# Patient Record
Sex: MALE | Race: WHITE | NOT HISPANIC OR LATINO | URBAN - METROPOLITAN AREA
[De-identification: names, ages, dates, MRNs, and addresses within clinical notes are randomized per-mention and may not be internally consistent; named-entity substitution may affect disease eponyms.]

---

## 2017-02-23 ENCOUNTER — EMERGENCY (EMERGENCY)
Facility: HOSPITAL | Age: 27
LOS: 1 days | Discharge: HOME | End: 2017-02-23
Attending: EMERGENCY MEDICINE | Admitting: EMERGENCY MEDICINE
Payer: COMMERCIAL

## 2017-02-23 VITALS
HEART RATE: 72 BPM | DIASTOLIC BLOOD PRESSURE: 70 MMHG | WEIGHT: 154.98 LBS | SYSTOLIC BLOOD PRESSURE: 110 MMHG | TEMPERATURE: 98 F | RESPIRATION RATE: 18 BRPM | OXYGEN SATURATION: 98 %

## 2017-02-23 DIAGNOSIS — R07.89 OTHER CHEST PAIN: ICD-10-CM

## 2017-02-23 LAB — D DIMER BLD IA.RAPID-MCNC: <150 NG/ML DDU — SIGNIFICANT CHANGE UP

## 2017-02-23 PROCEDURE — 93005 ELECTROCARDIOGRAM TRACING: CPT

## 2017-02-23 PROCEDURE — 99285 EMERGENCY DEPT VISIT HI MDM: CPT | Mod: 25

## 2017-02-23 PROCEDURE — 93010 ELECTROCARDIOGRAM REPORT: CPT

## 2017-02-23 PROCEDURE — 99283 EMERGENCY DEPT VISIT LOW MDM: CPT | Mod: 25

## 2017-02-23 PROCEDURE — 36415 COLL VENOUS BLD VENIPUNCTURE: CPT

## 2017-02-23 PROCEDURE — 85379 FIBRIN DEGRADATION QUANT: CPT

## 2017-02-23 NOTE — ED PROVIDER NOTE - MEDICAL DECISION MAKING DETAILS
nonradiating cp, constant, sp flight, also reported R calf pain, no obvious swelling noted or palpable cord, will d-dimer, (fhx of pe), screening ekg, low suspicion for ACS,

## 2017-02-23 NOTE — ED PROVIDER NOTE - DIAGNOSTIC INTERPRETATION
outside xray reviewed by me  ER Physician: Tima Levi  CHEST XRAY INTERPRETATION: lungs clear, heart shadow normal, bony structures intact

## 2017-02-23 NOTE — ED ADULT TRIAGE NOTE - CHIEF COMPLAINT QUOTE
chest pain for 8 days , pain to rt arm / rt leg today .pt was seen at [facility] and sent for ekg and evaluation.

## 2017-02-23 NOTE — ED PROVIDER NOTE - PHYSICAL EXAMINATION
CON: ao x 3, HENMT: clear oropharynx, soft neck, HEAD: atraumatic, CV: rrr, equal pulses b/l, RESP: cta b/l, GI: +BS, soft, nontender, no rebound, no guarding, SKIN: no rash, MSK: nontender, moving all extremities spontaneously, NEURO: nonfocal

## 2017-02-23 NOTE — ED ADULT NURSE NOTE - OBJECTIVE STATEMENT
[place] with complaint of R chest pain closer to axillary area beginning 4 days ago after return from [facility], pain is described as sharp and constant with radiation to R back and R upper arm, patient also  complains of R calf pain.

## 2017-02-23 NOTE — ED ADULT NURSE NOTE - CHIEF COMPLAINT QUOTE
chest pain for 8 days , pain to rt arm / rt leg today .pt was seen at [facility] and sent for ekg and evaluation.

## 2017-02-23 NOTE — ED ADULT NURSE NOTE - FAMILY HISTORY
Grandparent  Still living? No  Family history of cerebrovascular accident (CVA), Age at diagnosis: Age Unknown     Uncle  Still living? No  Family history of pulmonary embolism, Age at diagnosis: Age Unknown

## 2017-02-23 NOTE — ED PROVIDER NOTE - OBJECTIVE STATEMENT
26 yom pw right sided chest pain, localized, x 8 days, constant, nonradiating, no modifying or eliciting factors.  started initially after a flight (2hr w/ layover followed by another 2 hr).  states when he breathes deeply feels tighter.  today noticed to have pain to R bicep region, again, localized and nonradiating which prompted his visit.  pt also noted to have RLL calf pain for 1 day1.  nonexertional.  no other sx.  reported fhx of PE in one of the relatives.  nonsmokers.  no hx of CAD.

## 2018-07-09 ENCOUNTER — EMERGENCY (EMERGENCY)
Facility: HOSPITAL | Age: 28
LOS: 1 days | Discharge: HOME | End: 2018-07-09
Attending: EMERGENCY MEDICINE | Admitting: EMERGENCY MEDICINE
Payer: COMMERCIAL

## 2018-07-09 VITALS
HEART RATE: 71 BPM | RESPIRATION RATE: 18 BRPM | WEIGHT: 154.98 LBS | OXYGEN SATURATION: 98 % | TEMPERATURE: 98 F | DIASTOLIC BLOOD PRESSURE: 75 MMHG | SYSTOLIC BLOOD PRESSURE: 144 MMHG

## 2018-07-09 VITALS
OXYGEN SATURATION: 99 % | HEART RATE: 76 BPM | TEMPERATURE: 98 F | SYSTOLIC BLOOD PRESSURE: 142 MMHG | DIASTOLIC BLOOD PRESSURE: 76 MMHG | RESPIRATION RATE: 18 BRPM

## 2018-07-09 DIAGNOSIS — M79.662 PAIN IN LEFT LOWER LEG: ICD-10-CM

## 2018-07-09 DIAGNOSIS — M79.661 PAIN IN RIGHT LOWER LEG: ICD-10-CM

## 2018-07-09 DIAGNOSIS — R25.2 CRAMP AND SPASM: ICD-10-CM

## 2018-07-09 LAB
ALBUMIN SERPL ELPH-MCNC: 4.7 G/DL — SIGNIFICANT CHANGE UP (ref 3.3–5)
ALP SERPL-CCNC: 59 U/L — SIGNIFICANT CHANGE UP (ref 40–120)
ALT FLD-CCNC: 15 U/L — SIGNIFICANT CHANGE UP (ref 10–45)
ANION GAP SERPL CALC-SCNC: 13 MMOL/L — SIGNIFICANT CHANGE UP (ref 5–17)
APTT BLD: 31.3 SEC — SIGNIFICANT CHANGE UP (ref 27.5–37.4)
AST SERPL-CCNC: 24 U/L — SIGNIFICANT CHANGE UP (ref 10–40)
BILIRUB SERPL-MCNC: 1.1 MG/DL — SIGNIFICANT CHANGE UP (ref 0.2–1.2)
BUN SERPL-MCNC: 15 MG/DL — SIGNIFICANT CHANGE UP (ref 7–23)
CALCIUM SERPL-MCNC: 9.7 MG/DL — SIGNIFICANT CHANGE UP (ref 8.4–10.5)
CHLORIDE SERPL-SCNC: 102 MMOL/L — SIGNIFICANT CHANGE UP (ref 96–108)
CO2 SERPL-SCNC: 28 MMOL/L — SIGNIFICANT CHANGE UP (ref 22–31)
CREAT SERPL-MCNC: 0.97 MG/DL — SIGNIFICANT CHANGE UP (ref 0.5–1.3)
GLUCOSE SERPL-MCNC: 117 MG/DL — HIGH (ref 70–99)
HCT VFR BLD CALC: 43.4 % — SIGNIFICANT CHANGE UP (ref 39–50)
HGB BLD-MCNC: 14.5 G/DL — SIGNIFICANT CHANGE UP (ref 13–17)
INR BLD: 1.08 — SIGNIFICANT CHANGE UP (ref 0.88–1.16)
MCHC RBC-ENTMCNC: 28.7 PG — SIGNIFICANT CHANGE UP (ref 27–34)
MCHC RBC-ENTMCNC: 33.4 G/DL — SIGNIFICANT CHANGE UP (ref 32–36)
MCV RBC AUTO: 85.8 FL — SIGNIFICANT CHANGE UP (ref 80–100)
PLATELET # BLD AUTO: 177 K/UL — SIGNIFICANT CHANGE UP (ref 150–400)
POTASSIUM SERPL-MCNC: 4 MMOL/L — SIGNIFICANT CHANGE UP (ref 3.5–5.3)
POTASSIUM SERPL-SCNC: 4 MMOL/L — SIGNIFICANT CHANGE UP (ref 3.5–5.3)
PROT SERPL-MCNC: 6.6 G/DL — SIGNIFICANT CHANGE UP (ref 6–8.3)
PROTHROM AB SERPL-ACNC: 12 SEC — SIGNIFICANT CHANGE UP (ref 9.8–12.7)
RBC # BLD: 5.06 M/UL — SIGNIFICANT CHANGE UP (ref 4.2–5.8)
RBC # FLD: 12 % — SIGNIFICANT CHANGE UP (ref 10.3–16.9)
SODIUM SERPL-SCNC: 143 MMOL/L — SIGNIFICANT CHANGE UP (ref 135–145)
WBC # BLD: 5.6 K/UL — SIGNIFICANT CHANGE UP (ref 3.8–10.5)
WBC # FLD AUTO: 5.6 K/UL — SIGNIFICANT CHANGE UP (ref 3.8–10.5)

## 2018-07-09 PROCEDURE — 36415 COLL VENOUS BLD VENIPUNCTURE: CPT

## 2018-07-09 PROCEDURE — 93970 EXTREMITY STUDY: CPT

## 2018-07-09 PROCEDURE — 93970 EXTREMITY STUDY: CPT | Mod: 26

## 2018-07-09 PROCEDURE — 99284 EMERGENCY DEPT VISIT MOD MDM: CPT | Mod: 25

## 2018-07-09 PROCEDURE — 85730 THROMBOPLASTIN TIME PARTIAL: CPT

## 2018-07-09 PROCEDURE — 80053 COMPREHEN METABOLIC PANEL: CPT

## 2018-07-09 PROCEDURE — 85027 COMPLETE CBC AUTOMATED: CPT

## 2018-07-09 PROCEDURE — 85610 PROTHROMBIN TIME: CPT

## 2018-07-09 NOTE — ED ADULT NURSE NOTE - OBJECTIVE STATEMENT
CC of bilat leg pain R > L after travel from [place], better today than yesterday, never happened before  Family Hx (father) clot on the leg

## 2018-07-09 NOTE — ED ADULT TRIAGE NOTE - ARRIVAL INFO ADDITIONAL COMMENTS
Pt c/o Bilat calf pain and foot swelling. Pt states his pain started after flying from [place] to [place].

## 2018-07-09 NOTE — ED PROVIDER NOTE - OBJECTIVE STATEMENT
28M bilateral lower extremity pain cramping, no actual swelling, no chest pain, no sob, recent travel on airplane. States dad had blood clot so he was concerned. No chest pain, no sob, no numbness, tingling, weakness, no difficulty walking.  Calf cramps bl, now resolving.

## 2018-07-09 NOTE — ED PROVIDER NOTE - PHYSICAL EXAMINATION
gen: no acute distress, comfortable, conversant  HEENT: oropharynx clear  Neck: supple, no meningismus, no bruit noted bl carotid  CV: rrr no m/r/g 2+ radial pulse  Resp: ctab, no w/c/r  Abd: nontender, no rebound/guarding  Ext: no edema, pedal pulses 2+ no calf ttp, no swelling, no lower ext edema  Neuro: alert and oriented, cn grossly intact, strength equal in all 4 ext, sensation intact to light touch f/a/l, nl coordination, gait steady

## 2018-07-09 NOTE — ED PROVIDER NOTE - MEDICAL DECISION MAKING DETAILS
28m with bl le pain/cramping, no swelling, recent travel fam hx of dvt. No cp, no sob to suggest pe, distal neurovasc intact, cap refill < 2 secs 2+ dp/pt pulse, strength equal in bl le, pt without dvt on us formal done by radiology. will dc home likely muscle cramps no severe electrolyte abnl or anemia.

## 2019-01-31 NOTE — ED PROVIDER NOTE - DISPOSITION TYPE
Called to patient and gave results of holter along with new medication dosage for diltiazem--appointment has been scheduled with Dr. De León on 2/14/19--patient verbalizes understanding   DISCHARGE

## 2019-12-16 NOTE — ED ADULT NURSE NOTE - PATIENT DISCHARGE SIGNATURE
Health Maintenance Due   Topic Date Due     ZOSTER IMMUNIZATION (1 of 2) 06/20/1994     PNEUMOCOCCAL IMMUNIZATION 65+ LOW/MEDIUM RISK (2 of 2 - PCV13) 07/07/2011     EYE EXAM  04/24/2018     MICROALBUMIN  08/10/2019     INFLUENZA VACCINE (1) 09/01/2019     A1C  12/10/2019    Josselyn Tomlinson LPN........12/16/2019 10:45 AM   
24-Feb-2017

## 2020-03-19 ENCOUNTER — EMERGENCY (EMERGENCY)
Facility: HOSPITAL | Age: 30
LOS: 1 days | Discharge: HOME | End: 2020-03-19
Admitting: EMERGENCY MEDICINE
Payer: COMMERCIAL

## 2020-03-19 VITALS
RESPIRATION RATE: 18 BRPM | HEART RATE: 68 BPM | DIASTOLIC BLOOD PRESSURE: 94 MMHG | SYSTOLIC BLOOD PRESSURE: 168 MMHG | TEMPERATURE: 99 F | OXYGEN SATURATION: 99 % | WEIGHT: 154.98 LBS | HEIGHT: 71 IN

## 2020-03-19 VITALS
SYSTOLIC BLOOD PRESSURE: 108 MMHG | HEART RATE: 64 BPM | TEMPERATURE: 99 F | OXYGEN SATURATION: 98 % | DIASTOLIC BLOOD PRESSURE: 75 MMHG | RESPIRATION RATE: 16 BRPM

## 2020-03-19 DIAGNOSIS — L50.0 ALLERGIC URTICARIA: ICD-10-CM

## 2020-03-19 PROCEDURE — 99284 EMERGENCY DEPT VISIT MOD MDM: CPT

## 2020-03-19 RX ORDER — DEXAMETHASONE 0.5 MG/5ML
10 ELIXIR ORAL ONCE
Refills: 0 | Status: COMPLETED | OUTPATIENT
Start: 2020-03-19 | End: 2020-03-19

## 2020-03-19 RX ORDER — SODIUM CHLORIDE 9 MG/ML
1000 INJECTION INTRAMUSCULAR; INTRAVENOUS; SUBCUTANEOUS ONCE
Refills: 0 | Status: COMPLETED | OUTPATIENT
Start: 2020-03-19 | End: 2020-03-19

## 2020-03-19 RX ORDER — FAMOTIDINE 10 MG/ML
20 INJECTION INTRAVENOUS ONCE
Refills: 0 | Status: COMPLETED | OUTPATIENT
Start: 2020-03-19 | End: 2020-03-19

## 2020-03-19 RX ORDER — DIPHENHYDRAMINE HCL 50 MG
25 CAPSULE ORAL ONCE
Refills: 0 | Status: COMPLETED | OUTPATIENT
Start: 2020-03-19 | End: 2020-03-19

## 2020-03-19 RX ADMIN — FAMOTIDINE 100 MILLIGRAM(S): 10 INJECTION INTRAVENOUS at 15:33

## 2020-03-19 RX ADMIN — Medication 101 MILLIGRAM(S): at 17:04

## 2020-03-19 RX ADMIN — SODIUM CHLORIDE 1000 MILLILITER(S): 9 INJECTION INTRAMUSCULAR; INTRAVENOUS; SUBCUTANEOUS at 15:34

## 2020-03-19 RX ADMIN — Medication 102 MILLIGRAM(S): at 15:33

## 2020-03-19 NOTE — ED ADULT NURSE NOTE - OBJECTIVE STATEMENT
walk in with c/o full body and facial rash and hives, worsening over 2 days. NO angioedema, no SOB, no resp difficulty. Pt states hx of similar a few years ago " it just went away, but this time it didn't"

## 2020-03-19 NOTE — ED PROVIDER NOTE - CLINICAL SUMMARY MEDICAL DECISION MAKING FREE TEXT BOX
28 y/o male no hx here with uticara and lip swelling  symptoms controlled  prescribed steroids and epipen

## 2020-03-19 NOTE — ED ADULT NURSE REASSESSMENT NOTE - NS ED NURSE REASSESS COMMENT FT1
Pt reports partial relief of symptoms. Some swelling to face and hives on arms noted. Pt speaking in full and complete sentences. A&Ox3.

## 2020-03-19 NOTE — ED ADULT TRIAGE NOTE - CHIEF COMPLAINT QUOTE
Pt. c/o hives to his body which began yesterday. Pt. reports taking benadryl yesterday with not much improvement. Today pt. noted hives spread to his face mainly around his eyes. Pt. denies any airway involvement, in no acute distress.

## 2020-03-19 NOTE — ED PROVIDER NOTE - PATIENT PORTAL LINK FT
You can access the FollowMyHealth Patient Portal offered by [facility] by registering at the following website: [email or web address]. By joining [employer]’s FollowMyHealth portal, you will also be able to view your health information using other applications (apps) compatible with our system.

## 2020-03-19 NOTE — ED ADULT NURSE REASSESSMENT NOTE - NS ED NURSE REASSESS COMMENT FT1
Pt states, "I'm feeling fine." Noted that swelling around eyes has decreased. Upper lip swelling still present. Pt speaking in full and complete sentences. NAD. A&Ox3.

## 2020-03-19 NOTE — ED ADULT NURSE NOTE - NSIMPLEMENTINTERV_GEN_ALL_ED
Implemented All Fall Risk Interventions:  [place] to call system. Call bell, personal items and telephone within reach. Instruct patient to call for assistance. Room bathroom lighting operational. Non-slip footwear when patient is off stretcher. Physically safe environment: no spills, clutter or unnecessary equipment. Stretcher in lowest position, wheels locked, appropriate side rails in place. Provide visual cue, wrist band, yellow gown, etc. Monitor gait and stability. Monitor for mental status changes and reorient to person, place, and time. Review medications for side effects contributing to fall risk. Reinforce activity limits and safety measures with patient and family.

## 2020-03-19 NOTE — ED ADULT NURSE NOTE - CHPI ED NUR SYMPTOMS NEG
no confusion/no fever/no chills/no petechia/no inflammation/no scaly patches on skin/no body aches/no decreased eating/drinking/no vomiting

## 2020-03-19 NOTE — ED PROVIDER NOTE - CARE PLAN
Principal Discharge DX:	Allergic reaction, initial encounter  Assessment and plan of treatment:	allergic reaction
